# Patient Record
Sex: MALE | Race: WHITE | NOT HISPANIC OR LATINO
[De-identification: names, ages, dates, MRNs, and addresses within clinical notes are randomized per-mention and may not be internally consistent; named-entity substitution may affect disease eponyms.]

---

## 2017-06-15 ENCOUNTER — APPOINTMENT (OUTPATIENT)
Dept: CARDIOLOGY | Facility: CLINIC | Age: 72
End: 2017-06-15

## 2017-06-15 VITALS — SYSTOLIC BLOOD PRESSURE: 120 MMHG | DIASTOLIC BLOOD PRESSURE: 68 MMHG

## 2017-06-15 VITALS — DIASTOLIC BLOOD PRESSURE: 70 MMHG | SYSTOLIC BLOOD PRESSURE: 110 MMHG | HEART RATE: 4 BPM

## 2017-06-15 VITALS — WEIGHT: 215 LBS | BODY MASS INDEX: 30.78 KG/M2 | HEIGHT: 70 IN

## 2017-12-04 ENCOUNTER — APPOINTMENT (OUTPATIENT)
Dept: CARDIOLOGY | Facility: CLINIC | Age: 72
End: 2017-12-04

## 2017-12-04 VITALS — SYSTOLIC BLOOD PRESSURE: 130 MMHG | HEART RATE: 56 BPM | DIASTOLIC BLOOD PRESSURE: 80 MMHG

## 2017-12-04 DIAGNOSIS — R73.03 PREDIABETES.: ICD-10-CM

## 2017-12-04 DIAGNOSIS — J92.0 PLEURAL PLAQUE WITH PRESENCE OF ASBESTOS: ICD-10-CM

## 2017-12-04 RX ORDER — ATENOLOL 50 MG/1
50 TABLET ORAL DAILY
Refills: 0 | Status: ACTIVE | COMMUNITY

## 2018-05-08 ENCOUNTER — APPOINTMENT (OUTPATIENT)
Dept: CARDIOLOGY | Facility: CLINIC | Age: 73
End: 2018-05-08

## 2018-06-04 ENCOUNTER — APPOINTMENT (OUTPATIENT)
Dept: CARDIOLOGY | Facility: CLINIC | Age: 73
End: 2018-06-04

## 2018-06-26 ENCOUNTER — APPOINTMENT (OUTPATIENT)
Dept: CARDIOLOGY | Facility: CLINIC | Age: 73
End: 2018-06-26

## 2018-06-26 VITALS
HEART RATE: 56 BPM | SYSTOLIC BLOOD PRESSURE: 130 MMHG | DIASTOLIC BLOOD PRESSURE: 62 MMHG | WEIGHT: 215 LBS | HEIGHT: 70 IN | BODY MASS INDEX: 30.78 KG/M2

## 2018-07-01 ENCOUNTER — FORM ENCOUNTER (OUTPATIENT)
Age: 73
End: 2018-07-01

## 2018-07-02 ENCOUNTER — OUTPATIENT (OUTPATIENT)
Dept: OUTPATIENT SERVICES | Facility: HOSPITAL | Age: 73
LOS: 1 days | Discharge: HOME | End: 2018-07-02

## 2018-07-02 DIAGNOSIS — N28.9 DISORDER OF KIDNEY AND URETER, UNSPECIFIED: ICD-10-CM

## 2018-12-11 ENCOUNTER — APPOINTMENT (OUTPATIENT)
Dept: CARDIOLOGY | Facility: CLINIC | Age: 73
End: 2018-12-11

## 2018-12-11 VITALS
SYSTOLIC BLOOD PRESSURE: 140 MMHG | BODY MASS INDEX: 30.78 KG/M2 | WEIGHT: 215 LBS | DIASTOLIC BLOOD PRESSURE: 70 MMHG | HEART RATE: 55 BPM | HEIGHT: 70 IN

## 2018-12-11 NOTE — DISCUSSION/SUMMARY
[FreeTextEntry1] : Went over his abdominal sonogram . Advised him to see his Urologist about his kidney  cyst.

## 2018-12-11 NOTE — REASON FOR VISIT
[Follow-Up - Clinic] : a clinic follow-up of [Coronary Artery Disease] : coronary artery disease [Hyperlipidemia] : hyperlipidemia [Hypertension] : hypertension [Syncope] : syncope

## 2018-12-11 NOTE — HISTORY OF PRESENT ILLNESS
[FreeTextEntry1] : The patient has been feeling well. No chest paiin or shortness of breath . Overall he is feeling well.

## 2018-12-11 NOTE — PHYSICAL EXAM
[General Appearance - Well Developed] : well developed [Normal Appearance] : normal appearance [Well Groomed] : well groomed [General Appearance - Well Nourished] : well nourished [No Deformities] : no deformities [General Appearance - In No Acute Distress] : no acute distress [Normal Conjunctiva] : the conjunctiva exhibited no abnormalities [Eyelids - No Xanthelasma] : the eyelids demonstrated no xanthelasmas [Normal Oral Mucosa] : normal oral mucosa [No Oral Pallor] : no oral pallor [No Oral Cyanosis] : no oral cyanosis [Respiration, Rhythm And Depth] : normal respiratory rhythm and effort [Exaggerated Use Of Accessory Muscles For Inspiration] : no accessory muscle use [Auscultation Breath Sounds / Voice Sounds] : lungs were clear to auscultation bilaterally [Abdomen Soft] : soft [Abdomen Tenderness] : non-tender [Abdomen Mass (___ Cm)] : no abdominal mass palpated [Abnormal Walk] : normal gait [Gait - Sufficient For Exercise Testing] : the gait was sufficient for exercise testing [Skin Color & Pigmentation] : normal skin color and pigmentation [] : no rash [No Venous Stasis] : no venous stasis [Skin Lesions] : no skin lesions [No Skin Ulcers] : no skin ulcer [No Xanthoma] : no  xanthoma was observed [Oriented To Time, Place, And Person] : oriented to person, place, and time [Affect] : the affect was normal [Mood] : the mood was normal [No Anxiety] : not feeling anxious [Normal Rate] : normal [Rhythm Regular] : regular [No Murmur] : no murmurs heard [2+] : left 2+ [No Pitting Edema] : no pitting edema present [FreeTextEntry1] : No JVD

## 2019-05-02 ENCOUNTER — APPOINTMENT (OUTPATIENT)
Dept: CARDIOLOGY | Facility: CLINIC | Age: 74
End: 2019-05-02

## 2019-05-21 ENCOUNTER — APPOINTMENT (OUTPATIENT)
Dept: CARDIOLOGY | Facility: CLINIC | Age: 74
End: 2019-05-21
Payer: MEDICARE

## 2019-05-21 VITALS
BODY MASS INDEX: 31.5 KG/M2 | HEART RATE: 56 BPM | HEIGHT: 70 IN | DIASTOLIC BLOOD PRESSURE: 80 MMHG | SYSTOLIC BLOOD PRESSURE: 150 MMHG | WEIGHT: 220 LBS

## 2019-05-21 DIAGNOSIS — E87.6 HYPOKALEMIA: ICD-10-CM

## 2019-05-21 PROCEDURE — 99214 OFFICE O/P EST MOD 30 MIN: CPT

## 2019-05-21 PROCEDURE — 93000 ELECTROCARDIOGRAM COMPLETE: CPT

## 2019-05-21 NOTE — ASSESSMENT
[FreeTextEntry1] : The patient has had an episode of severe vertigo on New Years Day .  The patient had 3 glasses of wine the night before. . He had nausea and vomiting during this episode. Etiology is uncertain .Possible vestibular . Most likely not arrhythmia as he was dizzy with no report of fausto or tachyarrhythmias from the hosptial . The patient has had no chest pain . Known nonobstructive CAD . . Suspect underlying viral illness but uncertain why he remain fatigued .The patient had also lost hearing in his right ear . Needs ENT follow up .

## 2019-05-21 NOTE — HISTORY OF PRESENT ILLNESS
[FreeTextEntry1] : The patient was on a Cruise in [facility]. The patient  had a viral illness while on the cruise . Nausea and vomiting. The patient had developed severe dizziness and nausea . Developed a cold sweat. He went to [facility] . He had an extensive work up which was unremarkable. He saw ENT . Vestibulr in [facility]. . The patient has notr felt risght since that time. Had seen neurology show did a MRI and MRA all of which he was told was normal. No naren pain .

## 2019-07-09 ENCOUNTER — OUTPATIENT (OUTPATIENT)
Dept: OUTPATIENT SERVICES | Facility: HOSPITAL | Age: 74
LOS: 1 days | Discharge: HOME | End: 2019-07-09
Payer: COMMERCIAL

## 2019-07-09 DIAGNOSIS — I25.10 ATHEROSCLEROTIC HEART DISEASE OF NATIVE CORONARY ARTERY WITHOUT ANGINA PECTORIS: ICD-10-CM

## 2019-07-09 PROCEDURE — 78452 HT MUSCLE IMAGE SPECT MULT: CPT | Mod: 26

## 2019-07-09 PROCEDURE — 93018 CV STRESS TEST I&R ONLY: CPT

## 2019-07-09 PROCEDURE — 93016 CV STRESS TEST SUPVJ ONLY: CPT

## 2019-07-09 RX ORDER — REGADENOSON 0.08 MG/ML
0.4 INJECTION, SOLUTION INTRAVENOUS ONCE
Refills: 0 | Status: DISCONTINUED | OUTPATIENT
Start: 2019-07-09 | End: 2019-07-24

## 2019-09-10 ENCOUNTER — APPOINTMENT (OUTPATIENT)
Dept: CARDIOLOGY | Facility: CLINIC | Age: 74
End: 2019-09-10

## 2019-09-19 ENCOUNTER — APPOINTMENT (OUTPATIENT)
Dept: CARDIOLOGY | Facility: CLINIC | Age: 74
End: 2019-09-19

## 2021-04-23 ENCOUNTER — APPOINTMENT (OUTPATIENT)
Dept: CARDIOLOGY | Facility: CLINIC | Age: 76
End: 2021-04-23
Payer: MEDICARE

## 2021-04-23 VITALS
BODY MASS INDEX: 30.64 KG/M2 | SYSTOLIC BLOOD PRESSURE: 112 MMHG | WEIGHT: 214 LBS | TEMPERATURE: 97 F | HEIGHT: 70 IN | HEART RATE: 57 BPM | DIASTOLIC BLOOD PRESSURE: 78 MMHG

## 2021-04-23 PROCEDURE — 93000 ELECTROCARDIOGRAM COMPLETE: CPT

## 2021-04-23 PROCEDURE — 99214 OFFICE O/P EST MOD 30 MIN: CPT

## 2021-04-23 NOTE — ASSESSMENT
[FreeTextEntry1] : The patient has had ;long standing vertigo . This has been extensively worked up by ENT at [facility] . He had extensive verstibular therapy with some improvement . He has not had chest pain but . His LDL is not at goal. He has known nonobstructive CAD . He had been on Zocor in the past but this was stopped secondary to twitching although this continued even after being off of statins .

## 2021-04-23 NOTE — HISTORY OF PRESENT ILLNESS
[FreeTextEntry1] : The patient has not been seen for two years . He has had issues with vertigo. 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 He went for vestibular therapy . The patient has not had chest pain  He has marked decreased hearing acuity . He has not had chest pain . The patient has increased cholesterol and statin was stopped because of muscle twitching . This improved after stopping the medication and it has now recurred without restarting the medication

## 2021-08-19 ENCOUNTER — APPOINTMENT (OUTPATIENT)
Dept: CARDIOLOGY | Facility: CLINIC | Age: 76
End: 2021-08-19

## 2021-09-02 ENCOUNTER — APPOINTMENT (OUTPATIENT)
Dept: CARDIOLOGY | Facility: CLINIC | Age: 76
End: 2021-09-02
Payer: MEDICARE

## 2021-09-02 VITALS
HEIGHT: 70 IN | TEMPERATURE: 96.4 F | WEIGHT: 217 LBS | HEART RATE: 60 BPM | SYSTOLIC BLOOD PRESSURE: 120 MMHG | DIASTOLIC BLOOD PRESSURE: 68 MMHG | BODY MASS INDEX: 31.07 KG/M2

## 2021-09-02 PROCEDURE — 93000 ELECTROCARDIOGRAM COMPLETE: CPT

## 2021-09-02 PROCEDURE — 99214 OFFICE O/P EST MOD 30 MIN: CPT

## 2021-09-02 NOTE — ASSESSMENT
[FreeTextEntry1] : The patient has had intromittent unsteadiness . No chest pain . Some GARCIA . The patient has tolerated low dose statins. Known nonobstructive CAD on previous cath . He has had increased SOB . Etiology is uncertain

## 2021-09-02 NOTE — HISTORY OF PRESENT ILLNESS
[FreeTextEntry1] : The patient has not been seen for two years . He has had issues with vertigo. 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 He went for vestibular therapy . The patient has not had chest pain  He has marked decreased hearing acuity . He has not had chest pain . The patient has increased cholesterol and statin was stopped because of muscle twitching . This improved after stopping the medication and it has now recurred without restarting the medication . The pateint was given a medication by neurology for his Migraine headaches

## 2021-10-21 ENCOUNTER — APPOINTMENT (OUTPATIENT)
Dept: CARDIOLOGY | Facility: CLINIC | Age: 76
End: 2021-10-21
Payer: MEDICARE

## 2021-10-21 DIAGNOSIS — R53.83 OTHER FATIGUE: ICD-10-CM

## 2021-10-21 PROCEDURE — 93306 TTE W/DOPPLER COMPLETE: CPT

## 2021-10-21 RX ORDER — TRIAMTERENE AND HYDROCHLOROTHIAZIDE 25; 37.5 MG/1; MG/1
37.5-25 TABLET ORAL
Refills: 0 | Status: DISCONTINUED | COMMUNITY
End: 2021-10-21

## 2021-12-01 ENCOUNTER — OUTPATIENT (OUTPATIENT)
Dept: OUTPATIENT SERVICES | Facility: HOSPITAL | Age: 76
LOS: 1 days | Discharge: HOME | End: 2021-12-01
Payer: MEDICARE

## 2021-12-01 DIAGNOSIS — E78.5 HYPERLIPIDEMIA, UNSPECIFIED: ICD-10-CM

## 2021-12-01 DIAGNOSIS — I10 ESSENTIAL (PRIMARY) HYPERTENSION: ICD-10-CM

## 2021-12-01 DIAGNOSIS — I25.10 ATHEROSCLEROTIC HEART DISEASE OF NATIVE CORONARY ARTERY WITHOUT ANGINA PECTORIS: ICD-10-CM

## 2021-12-01 DIAGNOSIS — N28.9 DISORDER OF KIDNEY AND URETER, UNSPECIFIED: ICD-10-CM

## 2021-12-01 DIAGNOSIS — R55 SYNCOPE AND COLLAPSE: ICD-10-CM

## 2021-12-01 PROCEDURE — 78452 HT MUSCLE IMAGE SPECT MULT: CPT | Mod: 26,MH,76

## 2021-12-03 ENCOUNTER — APPOINTMENT (OUTPATIENT)
Dept: CARDIOLOGY | Facility: CLINIC | Age: 76
End: 2021-12-03
Payer: MEDICARE

## 2021-12-03 VITALS
DIASTOLIC BLOOD PRESSURE: 80 MMHG | OXYGEN SATURATION: 98 % | TEMPERATURE: 97.5 F | HEART RATE: 65 BPM | WEIGHT: 211 LBS | SYSTOLIC BLOOD PRESSURE: 148 MMHG | BODY MASS INDEX: 30.21 KG/M2 | HEIGHT: 70 IN

## 2021-12-03 PROCEDURE — 99214 OFFICE O/P EST MOD 30 MIN: CPT

## 2021-12-03 PROCEDURE — 93000 ELECTROCARDIOGRAM COMPLETE: CPT

## 2021-12-03 NOTE — HISTORY OF PRESENT ILLNESS
[FreeTextEntry1] :  . He has had issues with vertigo. He had a work up from ENT here and went to 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 He went for vestibular therapy . The patient has not had chest pain  He has marked decreased hearing acuity . He has not had chest pain . The patient has increased cholesterol and statin was stopped because of muscle twitching . This improved after stopping the medication and it has now recurred without restarting the medication . The pateint was given a medication by neurology for his Migraine headaches . The patient is going for Sinus Surgery and correction of deviated septum. ,

## 2021-12-03 NOTE — CARDIOLOGY SUMMARY
[___] : [unfilled] [de-identified] : 9-2-2021 Sinus Bradycardia NS T wave change \par 12-3-2021 MSR IVCD  [de-identified] : 12-1-2021 Lexiscan stress test No ischemia .  [de-identified] : 10- Normal LV systolic fucntion borderline concnetric LVH Trace MR trace TR

## 2021-12-03 NOTE — ASSESSMENT
[FreeTextEntry1] : The patient is going for Sinus surgery and correction of deviated septum. The patient has not had chest pain . He has more than 4 METS exercise tolerance . He has normal LV systolic function and had a negative nuclear stress test . He is an intermediate cardiac risk undergoing an intermediate risk procedure. He should take his BP medications of the morning of the procedure including Atenolol

## 2021-12-08 ENCOUNTER — EMERGENCY (EMERGENCY)
Facility: HOSPITAL | Age: 76
LOS: 0 days | Discharge: HOME | End: 2021-12-08
Attending: EMERGENCY MEDICINE | Admitting: EMERGENCY MEDICINE
Payer: MEDICARE

## 2021-12-08 VITALS
HEART RATE: 82 BPM | OXYGEN SATURATION: 98 % | RESPIRATION RATE: 18 BRPM | SYSTOLIC BLOOD PRESSURE: 159 MMHG | DIASTOLIC BLOOD PRESSURE: 74 MMHG | TEMPERATURE: 97 F | WEIGHT: 210.1 LBS

## 2021-12-08 DIAGNOSIS — R33.9 RETENTION OF URINE, UNSPECIFIED: ICD-10-CM

## 2021-12-08 PROCEDURE — 99283 EMERGENCY DEPT VISIT LOW MDM: CPT

## 2021-12-08 NOTE — ED PROVIDER NOTE - CARE PROVIDER_API CALL
Enio Leavitt)  Urology  [address] [place] [address], [address]  [place] [address]  Phone: [phone]  Fax: [phone]  Follow Up Time:

## 2021-12-08 NOTE — ED ADULT NURSE NOTE - OBJECTIVE STATEMENT
pt AOx4 presented to ED with wife c/o of urinary incontinence since this morning, had a sinus operation this morning and believes to be due to anesthesia. Pt states "can not use the bathroom" Pt abd distended. Pain worsens when palpated. Airway patent. Denies sob.

## 2021-12-08 NOTE — ED PROVIDER NOTE - OBJECTIVE STATEMENT
77 yo m sp nasal septum surg under general anesth today, now with urinary retention, just some dribbling a little bit. no ab pain, no flank pain. no fever or chills. no hematuria.

## 2021-12-08 NOTE — ED PROVIDER NOTE - PATIENT PORTAL LINK FT
You can access the FollowMyHealth Patient Portal offered by [facility] by registering at the following website: [email or web address]. By joining [facility]’s FollowMyHealth portal, you will also be able to view your health information using other applications (apps) compatible with our system.

## 2021-12-08 NOTE — ED PROVIDER NOTE - NS ED ROS FT
CONSTITUTIONAL: Negatve   SKIN: Negatve   HEAD: Negatve   EYES: Negatve   ENT: Negatve   NECK: Negatve   CARD:Negatve   RESP:Negatve   ABD: Negatve   : see hpi  EXT: Negatve  LYMPH: Negatve   NEURO: Negatve   PSYCH: Negatve

## 2021-12-08 NOTE — ED PROVIDER NOTE - PHYSICAL EXAMINATION
VITAL SIGNS: I have reviewed nursing notes and confirm.  CONSTITUTIONAL: Well-developed; well-nourished; in no acute distress.  SKIN: Skin exam is warm and dry, no acute rash.  HEAD: Normocephalic; atraumatic.  EYES: PERRL, EOM intact; conjunctiva and sclera clear.  ENT: No nasal discharge; airway clear.   NECK: Supple; non tender.  CARD:+ S1, S2   RESP: No wheezes, rales or rhonchi.  ABD: Normal bowel sounds; soft; non-distended; non-tender;  : penis circ, nml. urethra nml. scrotum nml.   EXT: Normal ROM. No cyanosis or edema.  LYMPH: No acute adenopathy.  NEURO: Alert. Grossly unremarkable. No focal deficits.  PSYCH: Cooperative, appropriate.

## 2021-12-08 NOTE — ED PROVIDER NOTE - CARE PROVIDERS DIRECT ADDRESSES
[email or web address]

## 2021-12-08 NOTE — ED ADULT TRIAGE NOTE - CHIEF COMPLAINT QUOTE
pt c/o of urinary incontinence since this morning, had a sinus operation this morning and believes to be due to anesthesia.

## 2021-12-08 NOTE — ED PROVIDER NOTE - NSFOLLOWUPINSTRUCTIONS_ED_ALL_ED_FT
Urinary Retention    Urinary retention is the inability to completely empty your bladder. This is a common problem in older men, especially with enlarged prostates. If you are sent home with a gudino catheter and a drainage system make sure to keep the drainage bag emptied and lower than your catheter. Keep the gudino catheter in until you follow up with a urologist.    SEEK IMMEDIATE MEDICAL CARE IF YOU DEVELOP THE FOLLOWING SYMPTOMS: the catheter stops draining urine, the catheter falls out, abdominal pain, nausea/vomiting, or chills/fever.

## 2021-12-09 ENCOUNTER — APPOINTMENT (OUTPATIENT)
Dept: UROLOGY | Facility: CLINIC | Age: 76
End: 2021-12-09
Payer: MEDICARE

## 2021-12-09 VITALS — BODY MASS INDEX: 30.06 KG/M2 | HEIGHT: 70 IN | WEIGHT: 210 LBS

## 2021-12-09 DIAGNOSIS — R33.9 RETENTION OF URINE, UNSPECIFIED: ICD-10-CM

## 2021-12-09 PROCEDURE — 99204 OFFICE O/P NEW MOD 45 MIN: CPT

## 2021-12-10 NOTE — REVIEW OF SYSTEMS
[Loss Of Hearing] : hearing loss [Nosebleeds] : nosebleeds [see HPI] : see HPI [Negative] : Heme/Lymph

## 2021-12-10 NOTE — PHYSICAL EXAM
[General Appearance - Well Developed] : well developed [General Appearance - Well Nourished] : well nourished [Normal Appearance] : normal appearance [Well Groomed] : well groomed [General Appearance - In No Acute Distress] : no acute distress [Edema] : no peripheral edema [Respiration, Rhythm And Depth] : normal respiratory rhythm and effort [Exaggerated Use Of Accessory Muscles For Inspiration] : no accessory muscle use [Abdomen Soft] : soft [Abdomen Tenderness] : non-tender [Costovertebral Angle Tenderness] : no ~M costovertebral angle tenderness [Urethral Meatus] : meatus normal [Urinary Bladder Findings] : the bladder was normal on palpation [Scrotum] : the scrotum was normal [Testes Mass (___cm)] : there were no testicular masses [FreeTextEntry1] : catheter in place  [Normal Station and Gait] : the gait and station were normal for the patient's age [] : no rash [No Focal Deficits] : no focal deficits [Oriented To Time, Place, And Person] : oriented to person, place, and time [Affect] : the affect was normal [Mood] : the mood was normal [Not Anxious] : not anxious [No Palpable Adenopathy] : no palpable adenopathy

## 2021-12-10 NOTE — ASSESSMENT
[FreeTextEntry1] : 75 yo with longstanding BPH\par not on any pharmacotherapy\par \par - will start FLomax\par - voiding trial on 12/13/2021\par - all questions answered\par

## 2021-12-10 NOTE — HISTORY OF PRESENT ILLNESS
[FreeTextEntry1] : 75 yo with acute onset of urinary retention\par following sinus surgery was unable to void\par he presented to the ER and a catheter was placed\par no volume was recorded\par \par he feels well - albeit uncomfortable from his sinus surgery\par \par he states that prior to the retention he had experienced problems with urination \par \par IPSS - 14\par IIEF -   07\par \par  [Urinary Frequency] : urinary frequency [Nocturia] : nocturia [Weak Stream] : weak stream

## 2021-12-10 NOTE — LETTER BODY
[Dear  ___] : Dear  [unfilled], [Consult Letter:] : I had the pleasure of evaluating your patient, [unfilled]. [Please see my note below.] : Please see my note below. [Sincerely,] : Sincerely, [FreeTextEntry3] : Mark Black MD, FACS\par

## 2021-12-13 ENCOUNTER — APPOINTMENT (OUTPATIENT)
Dept: UROLOGY | Facility: CLINIC | Age: 76
End: 2021-12-13
Payer: MEDICARE

## 2021-12-13 PROCEDURE — 51798 US URINE CAPACITY MEASURE: CPT

## 2021-12-13 PROCEDURE — 99214 OFFICE O/P EST MOD 30 MIN: CPT

## 2021-12-13 NOTE — PHYSICAL EXAM
[General Appearance - Well Developed] : well developed [General Appearance - Well Nourished] : well nourished [Normal Appearance] : normal appearance [Well Groomed] : well groomed [General Appearance - In No Acute Distress] : no acute distress [Abdomen Soft] : soft [Abdomen Tenderness] : non-tender [Costovertebral Angle Tenderness] : no ~M costovertebral angle tenderness [Urethral Meatus] : meatus normal [Urinary Bladder Findings] : the bladder was normal on palpation [Scrotum] : the scrotum was normal [Testes Mass (___cm)] : there were no testicular masses [Edema] : no peripheral edema [] : no respiratory distress [Exaggerated Use Of Accessory Muscles For Inspiration] : no accessory muscle use [Respiration, Rhythm And Depth] : normal respiratory rhythm and effort [Oriented To Time, Place, And Person] : oriented to person, place, and time [Affect] : the affect was normal [Mood] : the mood was normal [Not Anxious] : not anxious [Normal Station and Gait] : the gait and station were normal for the patient's age [No Focal Deficits] : no focal deficits [No Palpable Adenopathy] : no palpable adenopathy [FreeTextEntry1] : catheter in place

## 2021-12-13 NOTE — ASSESSMENT
[FreeTextEntry1] : 75 yo with longstanding BPH\par passed voiding trial - continue flomax\par \par - continue flomax FLomax\par - renal and bladder US in 6 months\par - PSA in 6 months\par \par instructed to contact me if he experiences difficulty with urination prior to follow up appointment \par

## 2021-12-13 NOTE — HISTORY OF PRESENT ILLNESS
[Urinary Retention] : urinary retention [Urinary Frequency] : urinary frequency [Nocturia] : nocturia [Weak Stream] : weak stream [FreeTextEntry1] : 77 yo initially seen 12/9/2021 with acute onset of urinary retention\par following sinus surgery was unable to void\par he presented to the ER and a catheter was placed\par as stated prior - no volume was recorded\par \par was started on flomax\par here for a voiding trial \par \par PVR 65 cc (5 hours after catheter removal)

## 2021-12-23 ENCOUNTER — APPOINTMENT (OUTPATIENT)
Dept: UROLOGY | Facility: CLINIC | Age: 76
End: 2021-12-23
Payer: MEDICARE

## 2021-12-23 VITALS — WEIGHT: 210 LBS | HEIGHT: 70 IN | BODY MASS INDEX: 30.06 KG/M2

## 2021-12-23 PROCEDURE — 51798 US URINE CAPACITY MEASURE: CPT

## 2021-12-23 PROCEDURE — 99214 OFFICE O/P EST MOD 30 MIN: CPT

## 2021-12-23 NOTE — PHYSICAL EXAM
[General Appearance - Well Developed] : well developed [General Appearance - Well Nourished] : well nourished [Normal Appearance] : normal appearance [Well Groomed] : well groomed [General Appearance - In No Acute Distress] : no acute distress [Abdomen Soft] : soft [Abdomen Tenderness] : non-tender [Costovertebral Angle Tenderness] : no ~M costovertebral angle tenderness [Urethral Meatus] : meatus normal [Urinary Bladder Findings] : the bladder was normal on palpation [Scrotum] : the scrotum was normal [Testes Mass (___cm)] : there were no testicular masses [Edema] : no peripheral edema [] : no respiratory distress [Respiration, Rhythm And Depth] : normal respiratory rhythm and effort [Exaggerated Use Of Accessory Muscles For Inspiration] : no accessory muscle use [Oriented To Time, Place, And Person] : oriented to person, place, and time [Affect] : the affect was normal [Mood] : the mood was normal [Not Anxious] : not anxious [Normal Station and Gait] : the gait and station were normal for the patient's age [No Focal Deficits] : no focal deficits [No Palpable Adenopathy] : no palpable adenopathy [FreeTextEntry1] : catheter in place

## 2021-12-23 NOTE — ASSESSMENT
[FreeTextEntry1] : 77 yo with longstanding BPH - now with frequency following ENT procedure\par 0 PVR today\par cannot tolerate flomax\par now with vague right flank pain\par \par - UA, CUlture\par - F/T PSA\par - Renal and Bladder US\par - F/U in 6 weeks to re-assess\par

## 2021-12-23 NOTE — HISTORY OF PRESENT ILLNESS
[Urinary Frequency] : urinary frequency [Weak Stream] : weak stream [FreeTextEntry1] : 75 yo initially seen 12/9/2021 with acute onset of urinary retention\par following sinus surgery was unable to void\par he presented to the ER and a catheter was placed\par \par was started on flomax - cAused severe symptoms (light headedness) - discontinued\par \par here for assessment of post void residual\par \par PVR 0 cc (today) [Urinary Retention] : no urinary retention [Nocturia] : no nocturia

## 2022-01-03 ENCOUNTER — OUTPATIENT (OUTPATIENT)
Dept: OUTPATIENT SERVICES | Facility: HOSPITAL | Age: 77
LOS: 1 days | Discharge: HOME | End: 2022-01-03
Payer: MEDICARE

## 2022-01-03 ENCOUNTER — RESULT REVIEW (OUTPATIENT)
Age: 77
End: 2022-01-03

## 2022-01-03 DIAGNOSIS — N40.1 BENIGN PROSTATIC HYPERPLASIA WITH LOWER URINARY TRACT SYMPTOMS: ICD-10-CM

## 2022-01-03 PROCEDURE — 76770 US EXAM ABDO BACK WALL COMP: CPT | Mod: 26

## 2022-01-10 ENCOUNTER — NON-APPOINTMENT (OUTPATIENT)
Age: 77
End: 2022-01-10

## 2022-01-11 ENCOUNTER — NON-APPOINTMENT (OUTPATIENT)
Age: 77
End: 2022-01-11

## 2022-02-03 ENCOUNTER — APPOINTMENT (OUTPATIENT)
Dept: UROLOGY | Facility: CLINIC | Age: 77
End: 2022-02-03
Payer: MEDICARE

## 2022-02-03 VITALS — TEMPERATURE: 97.2 F | HEIGHT: 70 IN | WEIGHT: 210 LBS | BODY MASS INDEX: 30.06 KG/M2

## 2022-02-03 DIAGNOSIS — N39.0 URINARY TRACT INFECTION, SITE NOT SPECIFIED: ICD-10-CM

## 2022-02-03 DIAGNOSIS — R97.20 ELEVATED PROSTATE, SPECIFIC ANTIGEN [PSA]: ICD-10-CM

## 2022-02-03 DIAGNOSIS — R33.9 RETENTION OF URINE, UNSPECIFIED: ICD-10-CM

## 2022-02-03 PROCEDURE — 99214 OFFICE O/P EST MOD 30 MIN: CPT

## 2022-02-03 NOTE — REVIEW OF SYSTEMS
[Loss Of Hearing] : no hearing loss [Nosebleeds] : no nosebleeds [see HPI] : see HPI [Negative] : Heme/Lymph

## 2022-02-03 NOTE — ASSESSMENT
[FreeTextEntry1] : 77 yo with longstanding BPH - now with frequency following ENT procedure\par 40 PVR today\par cannot tolerate flomax\par normal US\par age appropriate PSA\par \par - f/u in 6 months\par  [Urinary Tract Infection (599.0\N39.0)] : qualitative ~C was positive

## 2022-02-03 NOTE — PHYSICAL EXAM
[General Appearance - Well Developed] : well developed [General Appearance - Well Nourished] : well nourished [Normal Appearance] : normal appearance [Well Groomed] : well groomed [General Appearance - In No Acute Distress] : no acute distress [FreeTextEntry1] : packing material in both nasal passages, hearing aides  [Abdomen Soft] : soft [Abdomen Tenderness] : non-tender [Costovertebral Angle Tenderness] : no ~M costovertebral angle tenderness [Urethral Meatus] : meatus normal [Urinary Bladder Findings] : the bladder was normal on palpation [Scrotum] : the scrotum was normal [Testes Mass (___cm)] : there were no testicular masses [Edema] : no peripheral edema [] : no respiratory distress [Respiration, Rhythm And Depth] : normal respiratory rhythm and effort [Exaggerated Use Of Accessory Muscles For Inspiration] : no accessory muscle use [Oriented To Time, Place, And Person] : oriented to person, place, and time [Affect] : the affect was normal [Mood] : the mood was normal [Not Anxious] : not anxious [Normal Station and Gait] : the gait and station were normal for the patient's age [No Focal Deficits] : no focal deficits [No Palpable Adenopathy] : no palpable adenopathy

## 2022-02-03 NOTE — HISTORY OF PRESENT ILLNESS
[FreeTextEntry1] : 77 yo initially seen 12/9/2021 with acute onset of urinary retention\par following sinus surgery was unable to void\par he presented to the ER and a catheter was placed\par \par was started on flomax - caused severe symptoms (light headedness) - discontinued\par now he is urinating at baseline\par no complaints\par \par here for assessment of post void residual, review of PSA and renal / Bladder US\par \par PVR 40 cc (today)\par \par Renal and Bladder US 1/3/22\par 88 gram prostate\par no masses\par no stones\par PVR 17 cc\par \par urine culture - 1/07/22\par E. Coli\par \par PSA 1/7/22\par 6.6 with 23 L - \par \par repeated in [facility] - 3.3 with 30% free (1/2022) [Urinary Retention] : no urinary retention [Urinary Frequency] : no urinary frequency [Nocturia] : no nocturia [Weak Stream] : no weak stream

## 2022-06-08 ENCOUNTER — NON-APPOINTMENT (OUTPATIENT)
Age: 77
End: 2022-06-08

## 2022-06-13 ENCOUNTER — APPOINTMENT (OUTPATIENT)
Dept: UROLOGY | Facility: CLINIC | Age: 77
End: 2022-06-13
Payer: MEDICARE

## 2022-06-13 VITALS
WEIGHT: 210 LBS | HEART RATE: 55 BPM | HEIGHT: 70 IN | DIASTOLIC BLOOD PRESSURE: 68 MMHG | SYSTOLIC BLOOD PRESSURE: 122 MMHG | BODY MASS INDEX: 30.06 KG/M2

## 2022-06-13 DIAGNOSIS — N13.8 BENIGN PROSTATIC HYPERPLASIA WITH LOWER URINARY TRACT SYMPMS: ICD-10-CM

## 2022-06-13 DIAGNOSIS — N40.1 BENIGN PROSTATIC HYPERPLASIA WITH LOWER URINARY TRACT SYMPMS: ICD-10-CM

## 2022-06-13 PROCEDURE — 99214 OFFICE O/P EST MOD 30 MIN: CPT

## 2022-06-13 NOTE — ASSESSMENT
[FreeTextEntry1] : 77 yo with longstanding BPH - all symptoms resolved following ENT procedure\par 0 PVR today\par as per prior - cannot tolerate flomax\par normal US\par age appropriate PSA\par \par - f/u as needed \par - all questions answered\par

## 2022-06-13 NOTE — HISTORY OF PRESENT ILLNESS
[FreeTextEntry1] : 77 yo here for follow up - was initially seen 12/9/2021 with acute onset of urinary retention\par following sinus surgery was unable to void\par he presented to the ER and a catheter was placed\par \par was started on flomax - caused severe symptoms (light headedness) - discontinued\par now he is urinating at baseline\par no complaints\par \par here for assessment of post void residual, review of PSA and renal / Bladder US\par \par PVR 0 cc (today)\par \par Renal and Bladder US 1/3/22\par 88 gram prostate\par no masses\par no stones\par PVR 17 cc\par \par urine culture - 1/07/22\par E. Coli\par \par PSA 1/7/22\par 6.6 with 23 L - \par \par repeated in [facility] - 3.3 with 30% free (1/2022) [Urinary Retention] : no urinary retention [Urinary Frequency] : no urinary frequency [Nocturia] : no nocturia [Weak Stream] : no weak stream

## 2022-06-13 NOTE — PHYSICAL EXAM
[General Appearance - Well Developed] : well developed [General Appearance - Well Nourished] : well nourished [Normal Appearance] : normal appearance [Well Groomed] : well groomed [General Appearance - In No Acute Distress] : no acute distress [FreeTextEntry1] : packing material in both nasal passages, hearing aides  [Abdomen Soft] : soft [Abdomen Tenderness] : non-tender [Costovertebral Angle Tenderness] : no ~M costovertebral angle tenderness [Urethral Meatus] : meatus normal [Urinary Bladder Findings] : the bladder was normal on palpation [Scrotum] : the scrotum was normal [Testes Mass (___cm)] : there were no testicular masses [Edema] : no peripheral edema [] : no respiratory distress [Respiration, Rhythm And Depth] : normal respiratory rhythm and effort [Exaggerated Use Of Accessory Muscles For Inspiration] : no accessory muscle use [Oriented To Time, Place, And Person] : oriented to person, place, and time [Affect] : the affect was normal [Not Anxious] : not anxious [Mood] : the mood was normal [Normal Station and Gait] : the gait and station were normal for the patient's age [No Focal Deficits] : no focal deficits [No Palpable Adenopathy] : no palpable adenopathy

## 2022-08-04 ENCOUNTER — APPOINTMENT (OUTPATIENT)
Dept: UROLOGY | Facility: CLINIC | Age: 77
End: 2022-08-04

## 2022-08-17 ENCOUNTER — APPOINTMENT (OUTPATIENT)
Dept: CARDIOLOGY | Facility: CLINIC | Age: 77
End: 2022-08-17

## 2022-08-17 VITALS — BODY MASS INDEX: 29.92 KG/M2 | WEIGHT: 209 LBS | TEMPERATURE: 97.2 F | HEIGHT: 70 IN

## 2022-08-17 VITALS — HEART RATE: 55 BPM | SYSTOLIC BLOOD PRESSURE: 130 MMHG | DIASTOLIC BLOOD PRESSURE: 80 MMHG

## 2022-08-17 PROCEDURE — 99214 OFFICE O/P EST MOD 30 MIN: CPT

## 2022-08-17 PROCEDURE — 93000 ELECTROCARDIOGRAM COMPLETE: CPT

## 2022-08-17 RX ORDER — AMOXICILLIN 500 MG/1
500 CAPSULE ORAL TWICE DAILY
Qty: 14 | Refills: 0 | Status: DISCONTINUED | COMMUNITY
Start: 2022-01-11 | End: 2022-08-17

## 2022-08-17 RX ORDER — CEFUROXIME AXETIL 250 MG/1
250 TABLET ORAL
Qty: 10 | Refills: 0 | Status: DISCONTINUED | COMMUNITY
Start: 2021-12-20 | End: 2022-08-17

## 2022-08-17 RX ORDER — TAMSULOSIN HYDROCHLORIDE 0.4 MG/1
0.4 CAPSULE ORAL
Qty: 60 | Refills: 5 | Status: DISCONTINUED | COMMUNITY
Start: 2021-12-09 | End: 2022-08-17

## 2022-08-17 NOTE — ASSESSMENT
[FreeTextEntry1] : The patient has been doing well. No chest pain l He had sinus surgery witout issues and his Sinus situation has improved. He has Meniere's Disease and is seeing ENT . The patient states that his vertigo has improved . He had a recent nuclear stress test which was negative for ischemia  .

## 2022-08-17 NOTE — HISTORY OF PRESENT ILLNESS
[FreeTextEntry1] : The patient had FESS and correction of deviated septum with improvement . His vertigo has improved . He did go for vestibular therapy .He has not had chest pain

## 2022-08-17 NOTE — CARDIOLOGY SUMMARY
[___] : [unfilled] [de-identified] : 9-2-2021 Sinus Bradycardia NS T wave change \par 12-3-2021 NSR IVCD \par 8- Sinus Bradycardia NS  t wave change  [de-identified] : 12-1-2021 Lexiscan stress test No ischemia .  [de-identified] : 10- Normal LV systolic fucntion borderline concnetric LVH Trace MR trace TR

## 2023-03-15 ENCOUNTER — APPOINTMENT (OUTPATIENT)
Dept: CARDIOLOGY | Facility: CLINIC | Age: 78
End: 2023-03-15
Payer: MEDICARE

## 2023-03-15 VITALS — BODY MASS INDEX: 30.64 KG/M2 | HEIGHT: 70 IN | TEMPERATURE: 97.6 F | WEIGHT: 214 LBS

## 2023-03-15 VITALS — DIASTOLIC BLOOD PRESSURE: 70 MMHG | HEART RATE: 55 BPM | SYSTOLIC BLOOD PRESSURE: 130 MMHG

## 2023-03-15 DIAGNOSIS — I10 ESSENTIAL (PRIMARY) HYPERTENSION: ICD-10-CM

## 2023-03-15 PROCEDURE — 99214 OFFICE O/P EST MOD 30 MIN: CPT

## 2023-03-15 PROCEDURE — 93000 ELECTROCARDIOGRAM COMPLETE: CPT

## 2023-03-15 NOTE — HISTORY OF PRESENT ILLNESS
[FreeTextEntry1] : The patient had FESS and correction of deviated septum with improvement . His vertigo has improved . He did go for vestibular therapy .He has not had chest pain . The patient had been doing well with the vertigo until this month when he had unsteadiness . He is getting physical/vestiular therapy .

## 2023-03-15 NOTE — ASSESSMENT
[FreeTextEntry1] : The patient has been doing well. No chest pain l He had sinus surgery without issues and his Sinus situation has improved. He has Meniere's Disease and is seeing ENT . The patient was doing well [place] he started to be unsteady . The patient 's BP has been stable and he is getting PT and vestibular therapy . Symptoms are intermittent .

## 2023-03-15 NOTE — CARDIOLOGY SUMMARY
[___] : [unfilled] [de-identified] : 9-2-2021 Sinus Bradycardia NS T wave change \par 12-3-2021 NSR IVCD \par 8- Sinus Bradycardia NS  t wave change \par 3- Sinus Bradycardia  [de-identified] : 12-1-2021 Lexiscan stress test No ischemia .  [de-identified] : 10- Normal LV systolic fucntion borderline concnetric LVH Trace MR trace TR

## 2023-09-28 ENCOUNTER — APPOINTMENT (OUTPATIENT)
Dept: CARDIOLOGY | Facility: CLINIC | Age: 78
End: 2023-09-28
Payer: MEDICARE

## 2023-09-28 VITALS
HEART RATE: 56 BPM | HEIGHT: 70 IN | SYSTOLIC BLOOD PRESSURE: 126 MMHG | BODY MASS INDEX: 31.5 KG/M2 | DIASTOLIC BLOOD PRESSURE: 68 MMHG | WEIGHT: 220 LBS

## 2023-09-28 DIAGNOSIS — H81.09 MENIERE'S DISEASE, UNSPECIFIED EAR: ICD-10-CM

## 2023-09-28 PROCEDURE — 93000 ELECTROCARDIOGRAM COMPLETE: CPT

## 2023-09-28 PROCEDURE — 99214 OFFICE O/P EST MOD 30 MIN: CPT

## 2023-11-09 ENCOUNTER — RX RENEWAL (OUTPATIENT)
Age: 78
End: 2023-11-09

## 2023-11-09 RX ORDER — CANDESARTAN CILEXETIL 4 MG/1
4 TABLET ORAL DAILY
Qty: 90 | Refills: 3 | Status: ACTIVE | COMMUNITY
Start: 2021-10-21 | End: 1900-01-01

## 2024-01-19 ENCOUNTER — APPOINTMENT (OUTPATIENT)
Dept: CARDIOLOGY | Facility: CLINIC | Age: 79
End: 2024-01-19
Payer: MEDICARE

## 2024-01-19 VITALS
WEIGHT: 227 LBS | BODY MASS INDEX: 32.5 KG/M2 | SYSTOLIC BLOOD PRESSURE: 152 MMHG | HEIGHT: 70 IN | DIASTOLIC BLOOD PRESSURE: 70 MMHG | HEART RATE: 50 BPM

## 2024-01-19 VITALS — DIASTOLIC BLOOD PRESSURE: 70 MMHG | SYSTOLIC BLOOD PRESSURE: 140 MMHG

## 2024-01-19 PROCEDURE — 93000 ELECTROCARDIOGRAM COMPLETE: CPT

## 2024-01-19 PROCEDURE — 99214 OFFICE O/P EST MOD 30 MIN: CPT

## 2024-01-19 RX ORDER — MAGNESIUM OXIDE/MAG AA CHELATE 300 MG
CAPSULE ORAL
Refills: 0 | Status: ACTIVE | COMMUNITY

## 2024-01-19 RX ORDER — RIBOFLAVIN (VITAMIN B2) 50 MG
TABLET ORAL
Refills: 0 | Status: ACTIVE | COMMUNITY

## 2024-01-19 RX ORDER — ROSUVASTATIN CALCIUM 5 MG/1
5 TABLET, FILM COATED ORAL
Qty: 90 | Refills: 3 | Status: ACTIVE | COMMUNITY
Start: 2021-04-23 | End: 1900-01-01

## 2024-01-19 RX ORDER — UBIDECARENONE 200 MG
CAPSULE ORAL
Refills: 0 | Status: ACTIVE | COMMUNITY

## 2024-01-19 NOTE — PHYSICAL EXAM
[General Appearance - Well Developed] : well developed [Normal Appearance] : normal appearance [Well Groomed] : well groomed [General Appearance - Well Nourished] : well nourished [No Deformities] : no deformities [General Appearance - In No Acute Distress] : no acute distress [Normal Conjunctiva] : the conjunctiva exhibited no abnormalities [Eyelids - No Xanthelasma] : the eyelids demonstrated no xanthelasmas [Normal Oral Mucosa] : normal oral mucosa [No Oral Pallor] : no oral pallor [No Oral Cyanosis] : no oral cyanosis [FreeTextEntry1] : No JVD  [Respiration, Rhythm And Depth] : normal respiratory rhythm and effort [Exaggerated Use Of Accessory Muscles For Inspiration] : no accessory muscle use [Auscultation Breath Sounds / Voice Sounds] : lungs were clear to auscultation bilaterally [Abdomen Soft] : soft [Abdomen Tenderness] : non-tender [Abdomen Mass (___ Cm)] : no abdominal mass palpated [Abnormal Walk] : normal gait [Gait - Sufficient For Exercise Testing] : the gait was sufficient for exercise testing [Skin Color & Pigmentation] : normal skin color and pigmentation [] : no rash [No Venous Stasis] : no venous stasis [Skin Lesions] : no skin lesions [No Skin Ulcers] : no skin ulcer [No Xanthoma] : no  xanthoma was observed [Oriented To Time, Place, And Person] : oriented to person, place, and time [Affect] : the affect was normal [Mood] : the mood was normal [No Anxiety] : not feeling anxious [Normal Rate] : normal [Rhythm Regular] : regular [No Murmur] : no murmurs heard [2+] : left 2+ [No Pitting Edema] : no pitting edema present

## 2024-01-19 NOTE — CARDIOLOGY SUMMARY
[de-identified] : 9-2-2021 Sinus Bradycardia NS T wave change  12-3-2021 NSR IVCD  8- Sinus Bradycardia NS  t wave change  1- Sinus bradycardia  9- Sinus Bradycardia NS  twave change  3- Sinus Bradycardia  [de-identified] : 12-1-2021 Lexiscan stress test No ischemia .  [de-identified] : 10- Normal LV systolic fucntion borderline concnetric LVH Trace MR trace TR  [___] : [unfilled]

## 2024-01-19 NOTE — HISTORY OF PRESENT ILLNESS
[FreeTextEntry1] : The patient has had episodes of vertigo . He does use his vestibular therapy techniques  improves this . He does have periods of blurred vision and followed by dizziness as well. The patient now is seeing neurology at [facility] . 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 He had maneuver done with resolution of his vertigo  No chest pain

## 2024-01-19 NOTE — ASSESSMENT
[FreeTextEntry1] : The patient has nonobstructive CAD .  His LDL is not at goal and his BP is increased . . He has gained 7 pounds . His dizziness has improved after being treated for vertigo by neurology at [facility] .

## 2024-06-05 ENCOUNTER — APPOINTMENT (OUTPATIENT)
Dept: CARDIOLOGY | Facility: CLINIC | Age: 79
End: 2024-06-05
Payer: MEDICARE

## 2024-06-05 VITALS — HEIGHT: 70 IN | TEMPERATURE: 97 F

## 2024-06-05 VITALS — WEIGHT: 216 LBS | BODY MASS INDEX: 30.99 KG/M2

## 2024-06-05 VITALS — DIASTOLIC BLOOD PRESSURE: 64 MMHG | SYSTOLIC BLOOD PRESSURE: 114 MMHG | HEART RATE: 50 BPM

## 2024-06-05 DIAGNOSIS — K21.00 GASTRO-ESOPHAGEAL REFLUX DISEASE WITH ESOPHAGITIS, WITHOUT BLEEDING: ICD-10-CM

## 2024-06-05 DIAGNOSIS — R73.03 PREDIABETES.: ICD-10-CM

## 2024-06-05 DIAGNOSIS — N28.9 DISORDER OF KIDNEY AND URETER, UNSPECIFIED: ICD-10-CM

## 2024-06-05 DIAGNOSIS — I25.10 ATHEROSCLEROTIC HEART DISEASE OF NATIVE CORONARY ARTERY W/OUT ANGINA PECTORIS: ICD-10-CM

## 2024-06-05 DIAGNOSIS — E78.5 HYPERLIPIDEMIA, UNSPECIFIED: ICD-10-CM

## 2024-06-05 DIAGNOSIS — Z87.898 PERSONAL HISTORY OF OTHER SPECIFIED CONDITIONS: ICD-10-CM

## 2024-06-05 DIAGNOSIS — R55 SYNCOPE AND COLLAPSE: ICD-10-CM

## 2024-06-05 DIAGNOSIS — E11.9 TYPE 2 DIABETES MELLITUS W/OUT COMPLICATIONS: ICD-10-CM

## 2024-06-05 PROCEDURE — 99214 OFFICE O/P EST MOD 30 MIN: CPT

## 2024-06-05 PROCEDURE — 93000 ELECTROCARDIOGRAM COMPLETE: CPT

## 2024-06-05 RX ORDER — PANTOPRAZOLE 40 MG/1
40 TABLET, DELAYED RELEASE ORAL
Refills: 0 | Status: COMPLETED | COMMUNITY
End: 2024-06-05

## 2024-06-05 NOTE — ASSESSMENT
[FreeTextEntry1] : The patient has lost weight and his BP has improved . He has not had chest pain or SOB . His LDL is acceptable. The patient has had mild LVH on previous Echo . will repeat . 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

## 2024-06-05 NOTE — HISTORY OF PRESENT ILLNESS
[FreeTextEntry1] : The patient has had episodes of vertigo . He does use his vestibular therapy techniques  improves this .  This remains unchanged . He has had heartaches and his PCP had ordered a MRI of his head . He does have periods of blurred vision and followed by dizziness as well. The patient now is seeing neurology at [facility]  as well.  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 He had maneuver done with resolution of his vertigo  No chest pain . He has had a lot of bloating with constipation and belching . He is going to see GI

## 2024-06-05 NOTE — CARDIOLOGY SUMMARY
[de-identified] : 9-2-2021 Sinus Bradycardia NS T wave change  12-3-2021 NSR IVCD  8- Sinus Bradycardia NS  t wave change  1- Sinus bradycardia  9- Sinus Bradycardia NS  twave change  3- Sinus Bradycardia  6-5-2024 Sinus Bradycardia .  [de-identified] : 12-1-2021 Lexiscan stress test No ischemia .  [de-identified] : 10- Normal LV systolic fucntion borderline concnetric LVH Trace MR trace TR  [___] : [unfilled]

## 2024-10-04 ENCOUNTER — APPOINTMENT (OUTPATIENT)
Dept: CARDIOLOGY | Facility: CLINIC | Age: 79
End: 2024-10-04
Payer: MEDICARE

## 2024-10-04 DIAGNOSIS — Z87.898 PERSONAL HISTORY OF OTHER SPECIFIED CONDITIONS: ICD-10-CM

## 2024-10-04 DIAGNOSIS — R55 SYNCOPE AND COLLAPSE: ICD-10-CM

## 2024-10-04 DIAGNOSIS — I25.10 ATHEROSCLEROTIC HEART DISEASE OF NATIVE CORONARY ARTERY W/OUT ANGINA PECTORIS: ICD-10-CM

## 2024-10-04 PROCEDURE — 93306 TTE W/DOPPLER COMPLETE: CPT

## 2024-12-04 ENCOUNTER — APPOINTMENT (OUTPATIENT)
Dept: CARDIOLOGY | Facility: CLINIC | Age: 79
End: 2024-12-04
Payer: MEDICARE

## 2024-12-04 VITALS
DIASTOLIC BLOOD PRESSURE: 64 MMHG | HEIGHT: 70 IN | HEART RATE: 54 BPM | WEIGHT: 221 LBS | BODY MASS INDEX: 31.64 KG/M2 | SYSTOLIC BLOOD PRESSURE: 110 MMHG

## 2024-12-04 VITALS — DIASTOLIC BLOOD PRESSURE: 70 MMHG | SYSTOLIC BLOOD PRESSURE: 160 MMHG

## 2024-12-04 DIAGNOSIS — I25.10 ATHEROSCLEROTIC HEART DISEASE OF NATIVE CORONARY ARTERY W/OUT ANGINA PECTORIS: ICD-10-CM

## 2024-12-04 DIAGNOSIS — Z87.898 PERSONAL HISTORY OF OTHER SPECIFIED CONDITIONS: ICD-10-CM

## 2024-12-04 DIAGNOSIS — R73.03 PREDIABETES.: ICD-10-CM

## 2024-12-04 DIAGNOSIS — R55 SYNCOPE AND COLLAPSE: ICD-10-CM

## 2024-12-04 PROCEDURE — 99214 OFFICE O/P EST MOD 30 MIN: CPT

## 2024-12-04 PROCEDURE — 93000 ELECTROCARDIOGRAM COMPLETE: CPT

## 2024-12-04 RX ORDER — PANTOPRAZOLE SODIUM 40 MG/1
40 GRANULE, DELAYED RELEASE ORAL
Refills: 0 | Status: ACTIVE | COMMUNITY

## 2025-07-19 ENCOUNTER — NON-APPOINTMENT (OUTPATIENT)
Age: 80
End: 2025-07-19

## 2025-07-21 ENCOUNTER — APPOINTMENT (OUTPATIENT)
Dept: CARDIOLOGY | Facility: CLINIC | Age: 80
End: 2025-07-21
Payer: MEDICARE

## 2025-07-21 VITALS — HEART RATE: 50 BPM | DIASTOLIC BLOOD PRESSURE: 70 MMHG | SYSTOLIC BLOOD PRESSURE: 118 MMHG

## 2025-07-21 VITALS — BODY MASS INDEX: 31.5 KG/M2 | WEIGHT: 220 LBS | HEIGHT: 70 IN

## 2025-07-21 DIAGNOSIS — I25.10 ATHEROSCLEROTIC HEART DISEASE OF NATIVE CORONARY ARTERY W/OUT ANGINA PECTORIS: ICD-10-CM

## 2025-07-21 DIAGNOSIS — R73.03 PREDIABETES.: ICD-10-CM

## 2025-07-21 DIAGNOSIS — E11.9 TYPE 2 DIABETES MELLITUS W/OUT COMPLICATIONS: ICD-10-CM

## 2025-07-21 DIAGNOSIS — E78.5 HYPERLIPIDEMIA, UNSPECIFIED: ICD-10-CM

## 2025-07-21 DIAGNOSIS — E87.6 HYPOKALEMIA: ICD-10-CM

## 2025-07-21 DIAGNOSIS — Z87.898 PERSONAL HISTORY OF OTHER SPECIFIED CONDITIONS: ICD-10-CM

## 2025-07-21 PROCEDURE — 93000 ELECTROCARDIOGRAM COMPLETE: CPT

## 2025-07-21 PROCEDURE — 99214 OFFICE O/P EST MOD 30 MIN: CPT

## 2025-07-22 ENCOUNTER — APPOINTMENT (OUTPATIENT)
Dept: CARDIOLOGY | Facility: CLINIC | Age: 80
End: 2025-07-22
Payer: MEDICARE

## 2025-07-22 PROCEDURE — 93880 EXTRACRANIAL BILAT STUDY: CPT

## 2025-07-24 ENCOUNTER — OUTPATIENT (OUTPATIENT)
Dept: OUTPATIENT SERVICES | Facility: HOSPITAL | Age: 80
LOS: 1 days | End: 2025-07-24
Payer: MEDICARE

## 2025-07-24 ENCOUNTER — RESULT REVIEW (OUTPATIENT)
Age: 80
End: 2025-07-24

## 2025-07-24 DIAGNOSIS — I25.10 ATHEROSCLEROTIC HEART DISEASE OF NATIVE CORONARY ARTERY WITHOUT ANGINA PECTORIS: ICD-10-CM

## 2025-07-24 PROCEDURE — 78452 HT MUSCLE IMAGE SPECT MULT: CPT | Mod: 26

## 2025-07-24 PROCEDURE — A9500: CPT

## 2025-07-24 PROCEDURE — 93018 CV STRESS TEST I&R ONLY: CPT

## 2025-07-24 PROCEDURE — 78452 HT MUSCLE IMAGE SPECT MULT: CPT | Mod: MH

## 2025-07-25 DIAGNOSIS — I25.10 ATHEROSCLEROTIC HEART DISEASE OF NATIVE CORONARY ARTERY WITHOUT ANGINA PECTORIS: ICD-10-CM

## 2025-08-07 ENCOUNTER — APPOINTMENT (OUTPATIENT)
Dept: OTOLARYNGOLOGY | Facility: CLINIC | Age: 80
End: 2025-08-07
Payer: MEDICARE

## 2025-08-07 VITALS — BODY MASS INDEX: 31.5 KG/M2 | HEIGHT: 70 IN | WEIGHT: 220 LBS

## 2025-08-07 DIAGNOSIS — Z86.39 PERSONAL HISTORY OF OTHER ENDOCRINE, NUTRITIONAL AND METABOLIC DISEASE: ICD-10-CM

## 2025-08-07 DIAGNOSIS — Z82.49 FAMILY HISTORY OF ISCHEMIC HEART DISEASE AND OTHER DISEASES OF THE CIRCULATORY SYSTEM: ICD-10-CM

## 2025-08-07 DIAGNOSIS — Z82.5 FAMILY HISTORY OF ASTHMA AND OTHER CHRONIC LOWER RESPIRATORY DISEASES: ICD-10-CM

## 2025-08-07 DIAGNOSIS — H81.10 BENIGN PAROXYSMAL VERTIGO, UNSPECIFIED EAR: ICD-10-CM

## 2025-08-07 DIAGNOSIS — H90.3 SENSORINEURAL HEARING LOSS, BILATERAL: ICD-10-CM

## 2025-08-07 DIAGNOSIS — Z86.79 PERSONAL HISTORY OF OTHER DISEASES OF THE CIRCULATORY SYSTEM: ICD-10-CM

## 2025-08-07 DIAGNOSIS — J30.0 VASOMOTOR RHINITIS: ICD-10-CM

## 2025-08-07 DIAGNOSIS — Z83.3 FAMILY HISTORY OF DIABETES MELLITUS: ICD-10-CM

## 2025-08-07 DIAGNOSIS — Z87.09 PERSONAL HISTORY OF OTHER DISEASES OF THE RESPIRATORY SYSTEM: ICD-10-CM

## 2025-08-07 PROCEDURE — 92557 COMPREHENSIVE HEARING TEST: CPT

## 2025-08-07 PROCEDURE — 31231 NASAL ENDOSCOPY DX: CPT

## 2025-08-07 PROCEDURE — 92567 TYMPANOMETRY: CPT

## 2025-08-07 PROCEDURE — 99204 OFFICE O/P NEW MOD 45 MIN: CPT | Mod: 25

## 2025-08-07 RX ORDER — OMEPRAZOLE 40 MG/1
CAPSULE, DELAYED RELEASE ORAL
Refills: 0 | Status: ACTIVE | COMMUNITY

## 2025-08-16 ENCOUNTER — OUTPATIENT (OUTPATIENT)
Dept: OUTPATIENT SERVICES | Facility: HOSPITAL | Age: 80
LOS: 1 days | End: 2025-08-16
Payer: MEDICARE

## 2025-08-16 DIAGNOSIS — Z00.8 ENCOUNTER FOR OTHER GENERAL EXAMINATION: ICD-10-CM

## 2025-08-16 DIAGNOSIS — I25.10 ATHEROSCLEROTIC HEART DISEASE OF NATIVE CORONARY ARTERY WITHOUT ANGINA PECTORIS: ICD-10-CM

## 2025-08-16 DIAGNOSIS — Z87.898 PERSONAL HISTORY OF OTHER SPECIFIED CONDITIONS: ICD-10-CM

## 2025-08-16 PROCEDURE — 70496 CT ANGIOGRAPHY HEAD: CPT

## 2025-08-16 PROCEDURE — 70498 CT ANGIOGRAPHY NECK: CPT

## 2025-08-16 PROCEDURE — 70498 CT ANGIOGRAPHY NECK: CPT | Mod: 26

## 2025-08-16 PROCEDURE — 70496 CT ANGIOGRAPHY HEAD: CPT | Mod: 26

## 2025-08-17 DIAGNOSIS — I25.10 ATHEROSCLEROTIC HEART DISEASE OF NATIVE CORONARY ARTERY WITHOUT ANGINA PECTORIS: ICD-10-CM

## 2025-08-17 DIAGNOSIS — Z87.898 PERSONAL HISTORY OF OTHER SPECIFIED CONDITIONS: ICD-10-CM
